# Patient Record
Sex: FEMALE | Race: WHITE
[De-identification: names, ages, dates, MRNs, and addresses within clinical notes are randomized per-mention and may not be internally consistent; named-entity substitution may affect disease eponyms.]

---

## 2017-07-17 ENCOUNTER — HOSPITAL ENCOUNTER (OUTPATIENT)
Dept: HOSPITAL 76 - LAB.R | Age: 42
Discharge: HOME | End: 2017-07-17
Attending: NURSE PRACTITIONER
Payer: MEDICAID

## 2017-07-17 DIAGNOSIS — L03.115: Primary | ICD-10-CM

## 2017-07-17 PROCEDURE — 87205 SMEAR GRAM STAIN: CPT

## 2017-07-17 PROCEDURE — 87070 CULTURE OTHR SPECIMN AEROBIC: CPT

## 2017-12-15 ENCOUNTER — HOSPITAL ENCOUNTER (OUTPATIENT)
Dept: HOSPITAL 76 - DI.S | Age: 42
Discharge: HOME | End: 2017-12-15
Attending: NURSE PRACTITIONER
Payer: MEDICAID

## 2017-12-15 DIAGNOSIS — J18.9: Primary | ICD-10-CM

## 2017-12-15 PROCEDURE — 71020: CPT

## 2017-12-20 NOTE — XRAY REPORT
INDICATION: Community-acquired pneumonia.



EXAMINATION: TWO-VIEW CHEST: 12/15/2017



INDICATION: Community acquired pneumonia. 



COMPARISON: 05/19/2016, chest CT 06/18/2016.



FINDINGS: Frontal and lateral views of the chest demonstrate a normal cardiac 
silhouette. The lungs are clear. No effusion or pneumothorax is present. The 
infiltrate and effusion seen on 05/19/2016 have resolved.



IMPRESSION: NORMAL CHEST.



DD: 12/15/2017 11:56

TD: 12/15/2017 13:40

Job #: [id number]
[facility]

## 2018-12-13 ENCOUNTER — HOSPITAL ENCOUNTER (OUTPATIENT)
Dept: HOSPITAL 76 - DI | Age: 43
Discharge: HOME | End: 2018-12-13
Attending: NURSE PRACTITIONER
Payer: MEDICAID

## 2018-12-13 DIAGNOSIS — R91.8: Primary | ICD-10-CM

## 2018-12-13 PROCEDURE — 71250 CT THORAX DX C-: CPT

## 2018-12-13 NOTE — CT REPORT
Reason:  PULMONARY NODULE

Procedure Date:  12/13/2018   

Accession Number:  [id number] / [id number]                    

Procedure:  CT  - Chest W/O CPT Code:  

 

FULL RESULT:

 

 

EXAM:

CT CHEST

 

EXAM DATE: 12/13/2018 07:57 AM.

 

CLINICAL HISTORY: Pulmonary nodule.

 

COMPARISONS: Chest w/contrast 06/18/2016 8:17 AM.

 

TECHNIQUE: Routine helical CT imaging was performed through the chest. IV 

contrast: None. Reconstructions: Coronal and sagittal.

 

In accordance with CT protocol optimization, one or more of the following 

dose reduction techniques were utilized for this exam: automated exposure 

control, adjustment of mA and/or KV based on patient size, or use of 

iterative reconstructive technique.

 

FINDINGS:

Lungs/Pleura: The previously seen stellate nodularity in the left lingula 

is stable compared to 2016, and therefore established as scarring, a 

benign finding. Both the lingula and right middle lobe demonstrate subtle 

bronchiectasis with interval development of scant consolidative and 

tree-in-bud changes, as well as a small amount of atelectasis versus 

scarring in the right middle lobe. A few scattered pulmonary nodules 

measuring 3 mm or less are without evidence of spiculation or surrounding 

groundglass component are noted. There is no mass, pleural effusion or 

pneumothorax.

 

Mediastinum: Normal. No adenopathy or masses. The heart and great vessels 

are normal.

 

Bones: Unremarkable.

 

Visualized Abdomen: Unremarkable.

 

Other: None.

IMPRESSION:

Scarring in the lingula.

 

Similar changes have developed in the interval in the right middle lobe, 

possibly postinfectious findings.

 

Scattered pulmonary nodules measuring 3 mm or less without suspicious 

features.

 

Recommend follow-up of the described nodule(s) according to the following 

guidelines:

 

Fleischner Society Recommendations 2017

MacMahon et al. Radiology 2017

 

Solid Nodules-Low Risk Patients:

<6 mm (single or multiple) - No routine follow-up*

 

Solid Nodules-High Risk Patients:

<6 mm (single or multiple) -Optional CT at 12 months*

 

*Nodules < 6mm do not require routine follow-up, but suspicious nodule 

morphology, upper lobe location, or both may warrant 12 month follow-up

 

[facility]

## 2021-03-27 ENCOUNTER — HOSPITAL ENCOUNTER (OUTPATIENT)
Dept: HOSPITAL 76 - LAB | Age: 46
Discharge: HOME | End: 2021-03-27
Attending: MIDWIFE
Payer: MEDICAID

## 2021-03-27 DIAGNOSIS — O20.0: Primary | ICD-10-CM

## 2021-03-27 PROCEDURE — 36415 COLL VENOUS BLD VENIPUNCTURE: CPT

## 2021-03-27 PROCEDURE — 84702 CHORIONIC GONADOTROPIN TEST: CPT

## 2021-04-04 ENCOUNTER — HOSPITAL ENCOUNTER (OUTPATIENT)
Dept: HOSPITAL 76 - DI | Age: 46
Discharge: HOME | End: 2021-04-04
Attending: MIDWIFE
Payer: MEDICAID

## 2021-04-04 DIAGNOSIS — N83.201: ICD-10-CM

## 2021-04-04 DIAGNOSIS — Z3A.01: ICD-10-CM

## 2021-04-04 DIAGNOSIS — O26.841: Primary | ICD-10-CM

## 2021-04-04 DIAGNOSIS — O34.81: ICD-10-CM

## 2021-04-04 NOTE — ULTRASOUND REPORT
PROCEDURE:  OB First Trimester w/TV

 

INDICATIONS:  SUPERVISION OF PREGNANCY

 

OUTSIDE/PRIOR DATING DATA:  

Last menstrual period (LMP): 2/15/2021.  

LMP-based estimated date of delivery (GLENROY): 11/22/2021.  

First dating scan (date and location): Today's scan; [facility].  

Estimated date of delivery (GLENROY) from first dating scan: 11/29/2021.  

 

TECHNIQUE:  

Real-time scanning was performed of the fetus and maternal pelvic organs, with image documentation.  
Endovaginal scanning was also performed to better visualize the fetus and maternal ovaries.  

 

COMPARISON:  None.

 

FINDINGS:     

 

Embryo:  There is single intrauterine gestational sac with mean sac diameter of 0.9 cm. Additionally,
 there is a identified fetal pole for which the crown-rump length measures 0.3 cm, corresponding with
 an estimated gestational age of 5 weeks 6 days. Fetal heart tones are present measuring 100 bpm. Als
o, there is a yolk sac present.

 

Measurement variability in dating:  +/- 4 weeks by LMP, +/- 7 days by mean sac diameter (use before 6
 weeks gestation if crown-rump length not able to be measured), +/- 5 days by crown-rump length (6-12
 weeks gestation).  

 

Maternal organs: The right ovary measures 5.1 x 3.3 x 2.9 cm, containing a 2.9 x 2.6 x 2.5 cm cyst. A
long the inner margin of the cyst, there is a 0.5 x 1 cm soft tissue projection into the lumen of the
 cyst. No measurable internal vascularity. Left ovary measures 2.5 x 2.4 x 1.9 cm. Normal Doppler tyrese
earance of the ovaries. 

 

IMPRESSION:  

 

1. Single living intrauterine gestation with fetal heart rate of 100 bpm. Estimated gestational age b
y crown-rump length is 5 weeks 6 days.

2. Right ovarian 2.9 cm cyst with soft tissue component along the inner margin. Given the lack of vas
cularity, this likely represents atypical appearance of a retractile clot from a prior hemorrhagic cy
st. However, given the unlikely but potential alternative process of a soft tissue growth within the 
ovary; gynecologist evaluation and follow-up ultrasound targeting the right ovary is recommended in a
pproximately 6 weeks.

 

Reviewed by: Kris Gorman on 4/4/2021 11:57 AM AKDT

Approved by: Kris Gorman on 4/4/2021 11:57 AM [facility]

 

 

Station ID:  [id number]

## 2021-05-25 ENCOUNTER — HOSPITAL ENCOUNTER (OUTPATIENT)
Dept: HOSPITAL 76 - DI | Age: 46
Discharge: HOME | End: 2021-05-25
Attending: MIDWIFE
Payer: MEDICAID

## 2021-05-25 DIAGNOSIS — N83.201: ICD-10-CM

## 2021-05-25 DIAGNOSIS — N83.292: ICD-10-CM

## 2021-05-25 DIAGNOSIS — R93.89: Primary | ICD-10-CM

## 2021-05-26 NOTE — ULTRASOUND REPORT
PROCEDURE:  Pelvic w/Transvaginal

 

INDICATIONS:  RIGHT OVARIAN CYST

 

TECHNIQUE:  

Real-time scanning was performed of the pelvic organs, with image documentation.  Additional endovagi
nal scanning was necessary due to incomplete visualization of the adnexal and endometrial structures 
by transabdominal scanning.  

 

COMPARISON:  None.

 

FINDINGS:  

No pathologic free abdominal or pelvic fluid.  

 

Uterus:  Uterus is anteverted and measures 8.9 x 3.5 x 4.9 cm.  The endometrium measures 1.3 cm in co
mbined thickness.  There is mild heterogeneity of the endometrium. No internal vascularity on color D
oppler interrogation. There is a midline posterior subserosal fibroid measuring 0.7 x 0.7 x 0.8 cm.

 

Ovaries:  The right ovary measures 1.7 x 2.5 x 1.7 cm and the left ovary measures 4.1 x 2 x 2.3 cm. T
here is a thick-walled heterogeneous cyst within the left ovary with internal septations measuring up
 to 2.9 x 1.5 x 2.8 cm. Peripheral vascularity demonstrated on color Doppler interrogation. Findings 
likely represent a hemorrhagic cyst. There is a thin-walled cyst within the right ovary measuring up 
to 1.4 x 1.2 x 1.2 cm which is not well visualized but likely represents a follicular cyst.

 

IMPRESSION:  

 

1. Slight thickening and heterogeneity of the endometrium without definite internal vascularity to scanlon
ggest retained products of conception.

 

2. Complex left ovarian cyst likely representing a hemorrhagic cyst. Consider follow-up ultrasound in
 6 weeks to demonstrate resolution.

 

3. Small right ovarian cyst not well visualized but likely representing a follicular cyst.

 

Reviewed by: Indra Borrego MD on 5/26/2021 5:39 PM PDT

Approved by: Indra Borrego MD on 5/26/2021 5:39 PM PDT

 

 

Station ID:  [id number]

## 2021-06-09 ENCOUNTER — HOSPITAL ENCOUNTER (OUTPATIENT)
Dept: HOSPITAL 76 - LAB | Age: 46
Discharge: HOME | End: 2021-06-09
Attending: MIDWIFE
Payer: MEDICAID

## 2021-06-09 PROCEDURE — 84702 CHORIONIC GONADOTROPIN TEST: CPT

## 2021-06-09 PROCEDURE — 36415 COLL VENOUS BLD VENIPUNCTURE: CPT

## 2021-06-11 ENCOUNTER — HOSPITAL ENCOUNTER (OUTPATIENT)
Dept: HOSPITAL 76 - LAB | Age: 46
Discharge: HOME | End: 2021-06-11
Attending: MIDWIFE
Payer: MEDICAID

## 2021-06-11 DIAGNOSIS — O03.9: Primary | ICD-10-CM

## 2021-06-11 PROCEDURE — 36415 COLL VENOUS BLD VENIPUNCTURE: CPT

## 2021-06-11 PROCEDURE — 84702 CHORIONIC GONADOTROPIN TEST: CPT

## 2021-06-13 ENCOUNTER — HOSPITAL ENCOUNTER (OUTPATIENT)
Dept: HOSPITAL 76 - LAB | Age: 46
Discharge: HOME | End: 2021-06-13
Attending: MIDWIFE
Payer: MEDICAID

## 2021-06-13 PROCEDURE — 36415 COLL VENOUS BLD VENIPUNCTURE: CPT

## 2021-06-13 PROCEDURE — 84702 CHORIONIC GONADOTROPIN TEST: CPT

## 2021-09-23 ENCOUNTER — HOSPITAL ENCOUNTER (OUTPATIENT)
Dept: HOSPITAL 76 - LAB.S | Age: 46
Discharge: HOME | End: 2021-09-23
Attending: NURSE PRACTITIONER
Payer: MEDICAID

## 2021-09-23 DIAGNOSIS — F41.1: Primary | ICD-10-CM

## 2021-09-23 DIAGNOSIS — F10.10: ICD-10-CM

## 2021-09-23 LAB
ALBUMIN DIAFP-MCNC: 4.3 G/DL
ALBUMIN/GLOB SERPL: 1.5 {RATIO}
ALP SERPL-CCNC: 64 IU/L
ALT SERPL W P-5'-P-CCNC: 20 IU/L
ANION GAP SERPL CALCULATED.4IONS-SCNC: 7 MMOL/L
AST SERPL W P-5'-P-CCNC: 21 IU/L
BASOPHILS NFR BLD AUTO: 0 10^3/UL
BASOPHILS NFR BLD AUTO: 0.6 %
BILIRUB BLD-MCNC: 0.7 MG/DL
BUN SERPL-MCNC: 10 MG/DL
CALCIUM UR-MCNC: 9.1 MG/DL
CHLORIDE SERPL-SCNC: 103 MMOL/L
CHOLEST SERPL-MCNC: 182 MG/DL
CO2 SERPL-SCNC: 28 MMOL/L
CREAT SERPLBLD-SCNC: 0.7 MG/DL
EOSINOPHIL # BLD AUTO: 0.1 10^3/UL
EOSINOPHIL NFR BLD AUTO: 2.5 %
ERYTHROCYTE [DISTWIDTH] IN BLOOD BY AUTOMATED COUNT: 12.1 %
GFRSERPLBLD MDRD-ARVRAT: 91 ML/MIN/{1.73_M2}
GLOBULIN SER-MCNC: 2.9 G/DL
GLUCOSE SERPL-MCNC: 94 MG/DL
HCT VFR BLD AUTO: 43.1 %
HDLC SERPL-MCNC: 72 MG/DL
HDLC SERPL: 2.5 {RATIO}
HGB UR QL STRIP: 13.9 G/DL
LDLC SERPL CALC-MCNC: 98 MG/DL
LDLC/HDLC SERPL: 1.4 {RATIO}
LYMPHOCYTES # SPEC AUTO: 1.1 10^3/UL
LYMPHOCYTES NFR BLD AUTO: 22.3 %
MCH RBC QN AUTO: 31.3 PG
MCHC RBC AUTO-ENTMCNC: 32.3 G/DL
MCV RBC AUTO: 97.1 FL
MONOCYTES # BLD AUTO: 0.5 10^3/UL
MONOCYTES NFR BLD AUTO: 10.7 %
NEUTROPHILS # BLD AUTO: 3.1 10^3/UL
NEUTROPHILS # SNV AUTO: 4.9 X10^3/UL
NEUTROPHILS NFR BLD AUTO: 63.7 %
NRBC # BLD AUTO: 0 /100WBC
NRBC # BLD AUTO: 0 X10^3/UL
PDW BLD AUTO: 11 FL
PLATELET # BLD: 238 10^3/UL
POTASSIUM SERPL-SCNC: 3.9 MMOL/L
PROT SPEC-MCNC: 7.2 G/DL
RBC MAR: 4.44 10^6/UL
SODIUM SERPLBLD-SCNC: 138 MMOL/L
TRIGL P FAST SERPL-MCNC: 61 MG/DL
TSH SERPL-ACNC: 2.39 UIU/ML
VLDLC SERPL-SCNC: 12 MG/DL

## 2021-09-23 PROCEDURE — 85025 COMPLETE CBC W/AUTO DIFF WBC: CPT

## 2021-09-23 PROCEDURE — 80053 COMPREHEN METABOLIC PANEL: CPT

## 2021-09-23 PROCEDURE — 84443 ASSAY THYROID STIM HORMONE: CPT

## 2021-09-23 PROCEDURE — 83721 ASSAY OF BLOOD LIPOPROTEIN: CPT

## 2021-09-23 PROCEDURE — 36415 COLL VENOUS BLD VENIPUNCTURE: CPT

## 2021-09-23 PROCEDURE — 80061 LIPID PANEL: CPT
